# Patient Record
Sex: FEMALE | URBAN - METROPOLITAN AREA
[De-identification: names, ages, dates, MRNs, and addresses within clinical notes are randomized per-mention and may not be internally consistent; named-entity substitution may affect disease eponyms.]

---

## 2022-02-18 ENCOUNTER — APPOINTMENT (OUTPATIENT)
Dept: URBAN - METROPOLITAN AREA CLINIC 265 | Age: 27
Setting detail: DERMATOLOGY
End: 2022-02-18

## 2022-02-18 VITALS — HEIGHT: 67 IN | WEIGHT: 160 LBS

## 2022-02-18 DIAGNOSIS — L20.89 OTHER ATOPIC DERMATITIS: ICD-10-CM

## 2022-02-18 DIAGNOSIS — L81.1 CHLOASMA: ICD-10-CM

## 2022-02-18 PROBLEM — L20.84 INTRINSIC (ALLERGIC) ECZEMA: Status: ACTIVE | Noted: 2022-02-18

## 2022-02-18 PROCEDURE — OTHER PRESCRIPTION MEDICATION MANAGEMENT: OTHER

## 2022-02-18 PROCEDURE — OTHER MIPS QUALITY: OTHER

## 2022-02-18 PROCEDURE — OTHER MEDICATION COUNSELING: OTHER

## 2022-02-18 PROCEDURE — OTHER PRESCRIPTION: OTHER

## 2022-02-18 PROCEDURE — 99203 OFFICE O/P NEW LOW 30 MIN: CPT

## 2022-02-18 PROCEDURE — OTHER COUNSELING: OTHER

## 2022-02-18 RX ORDER — HYDROQUINONE 40 MG/G
CREAM TOPICAL
Qty: 28.35 | Refills: 4 | Status: ERX | COMMUNITY
Start: 2022-02-18

## 2022-02-18 RX ORDER — TRIAMCINOLONE ACETONIDE 1 MG/G
CREAM TOPICAL BID
Qty: 1 | Refills: 3 | Status: ERX | COMMUNITY
Start: 2022-02-18

## 2022-02-18 ASSESSMENT — LOCATION DETAILED DESCRIPTION DERM
LOCATION DETAILED: LEFT DISTAL PRETIBIAL REGION
LOCATION DETAILED: RIGHT ELBOW
LOCATION DETAILED: LEFT ELBOW
LOCATION DETAILED: LEFT CENTRAL MALAR CHEEK
LOCATION DETAILED: RIGHT DISTAL PRETIBIAL REGION

## 2022-02-18 ASSESSMENT — LOCATION ZONE DERM
LOCATION ZONE: FACE
LOCATION ZONE: ARM
LOCATION ZONE: LEG

## 2022-02-18 ASSESSMENT — LOCATION SIMPLE DESCRIPTION DERM
LOCATION SIMPLE: RIGHT PRETIBIAL REGION
LOCATION SIMPLE: RIGHT ELBOW
LOCATION SIMPLE: LEFT CHEEK
LOCATION SIMPLE: LEFT ELBOW
LOCATION SIMPLE: LEFT PRETIBIAL REGION

## 2022-02-18 NOTE — PROCEDURE: PRESCRIPTION MEDICATION MANAGEMENT
Detail Level: Zone
Initiate Treatment: triamcinolone acetonide 0.1 % topical cream BID\\nQuantity: 1.0 Unspecified  Days Supply: 30\\nSig: Apply twice daily to eczema twice a day x 14 days stop x 7 days repeat cycle as needed for flares
Render In Strict Bullet Format?: No
Plan: Follow up as needed
Plan: Recommended dove unscented soap and Cerave moisturizer \\nFollow up in 3 months
Initiate Treatment: hydroquinone 4 % topical cream \\nQuantity: 28.35 g  Days Supply: 30\\nSig: Apply to affected areas on face twice a day

## 2022-02-18 NOTE — PROCEDURE: MEDICATION COUNSELING

## 2022-02-18 NOTE — PROCEDURE: MEDICATION COUNSELING
PHYSICIAN NEXT STEPS:  Review Only    CHIEF COMPLAINT:  Chief Complaint/Protocol Used: Blood Pressure - High (A)  Onset: This morning       ASSESSMENT:  ? Onset: This morning   ? Normal True  ? Normal, no trouble breathing True  ? Blood Pressure: Latest blood pressure 162/109 at 7 pm and rechecked at 10 pm 158/104  ? Onset: This morning   ? How: Digital blood pressure machine at home   ? History: Yes   ? Medications: Yes  ? Other Symptoms: Headache, dizziness this morning only  -------------------------------------------------------    DISPOSITION:  Disposition Recommendation: See PCP Within 3 Days  Questions that led to disposition:  ? Systolic BP >= 160 OR Diastolic >= 100  Patient Directed To: Unspecified  Patient Intended Action: Other      CALL NOTES:  08/19/2021 at 10:13 PM by Zoila Riley  ? Patient agreed to go to [facility] tomorrrow to be evaluated     DISPOSITION OVERRIDE/PROVIDER CONSULT:  Disposition Override: See PCP Within 24 Hours  Override Source: Nurse clinical judgment  Consulted with PCP: No  Consulted with On-Call Physician: No    CALLER CONTACT INFO:  Name: Day Gaines (Self)  Phone 1: [phone] (Home Phone)  Phone 2: [phone] (Mobile) - Preferred      ENCOUNTER STARTED:  08/19/21 10:00:13 PM  ENCOUNTER ASSIGNED TO/CLOSED BY:  Zoila Riley @ 08/19/21 10:14:00 PM      -------------------------------------------------------    CARE ADVICE given per Blood Pressure - High (A) guideline.  SEE PCP WITHIN 3 DAYS:  * You need to be seen within 2 or 3 days.  * PCP VISIT: Call your doctor (or NP/PA) during regular office hours and make an appointment. A clinic or urgent care center are good places to go for care if your doctor's office is closed or you can't get an appointment. NOTE: If office will be open   tomorrow, tell caller to call then, not in 3 days.  * IF PATIENT HAS NO PCP: A clinic or urgent care center are good places to go for care if you do not have a primary care  provider. NOTE: Try to help caller find a PCP for future care (e.g., use a physician referral line). Having a PCP or 'medical home'   means better long-term care.; HIGH BLOOD PRESSURE:  * Untreated high blood pressure may cause damage to your heart, brain, kidneys, and eyes.  * Treatment of high blood pressure can reduce the risk of stroke, heart attack, and heart failure.  * The goal of blood pressure treatment for most people with hypertension is to keep the blood pressure under 140/90. For people that are 60 years or older, your doctor may instead want to keep the blood pressure under 150/90.; LIFESTYLE MODIFICATIONS:  * The following things can help you reduce your blood pressure.  * EAT HEALTHY: Eat a diet rich in fresh fruits and vegetables, dietary fiber, non-animal protein (e.g., soy), and low-fat dairy products. Avoid foods with a high content of saturated fat or cholesterol.  * DECREASE SODIUM INTAKE: Aim to eat less than 2.4 g (100 mmol) of sodium each day. Unfortunately 75% of the salt in the average person's diet is in pre-processed foods.  * LIMIT ALCOHOL: Limit alcoholic beverages to no more than one drink per day for women and no more than two drinks for men. A drink is 1.5 oz hard liquor (one shot or jigger; 45 ml), 5 oz wine (small glass; 150 ml), 12 oz beer (one can; 360 ml).  * EXERCISE, BE MORE PHYSICALLY ACTIVE: Do at least 30 minutes of aerobic exercise (e.g., brisk walking) most days of the week. Other examples of aerobic activities cycling, jogging, and swimming.  * REDUCE WEIGHT AND WAISTLINE: It is important to maintain a normal body weight. The goal should be a BMI (body mass index) under 25 for men and women, a waist circumference under 40 inches (102 cm) in men, and a waist circumference under 35 inches (88   cm) in women.  * REDUCE STRESS: Find activities that help reduce your stress. Examples might include meditation, yoga, or even a restful walk in a park.; CALL BACK IF:  * Weakness  or numbness of the face, arm or leg on one side of the body occurs  * Difficulty walking, difficulty talking, or severe headache occurs  * Chest pain or difficulty breathing occurs  * Your blood pressure is over 180/110  * You become worse.      UNDERSTANDS CARE ADVICE: Yes    AGREES WITH CARE ADVICE: Yes    WILL FOLLOW CARE ADVICE: Yes    -------------------------------------------------------   Glycopyrrolate Pregnancy And Lactation Text: This medication is Pregnancy Category B and is considered safe during pregnancy. It is unknown if it is excreted breast milk.

## 2022-02-18 NOTE — PROCEDURE: MEDICATION COUNSELING
PDMP reviewed  No red flags identified; safe to proceed with prescription      PDMP Review       Value Time User    PDMP Reviewed  Yes 4/1/2021  9:34 AM Rambo Ruano MD Glycopyrrolate Counseling:  I discussed with the patient the risks of glycopyrrolate including but not limited to skin rash, drowsiness, dry mouth, difficulty urinating, and blurred vision.

## 2023-11-15 NOTE — PROCEDURE: MEDICATION COUNSELING
Scc Well Differentiated Histology Text: There were aggregates of well-differentiated squamous cells. Acitretin Pregnancy And Lactation Text: This medication is Pregnancy Category X and should not be given to women who are pregnant or may become pregnant in the future. This medication is excreted in breast milk.